# Patient Record
Sex: FEMALE | Race: WHITE
[De-identification: names, ages, dates, MRNs, and addresses within clinical notes are randomized per-mention and may not be internally consistent; named-entity substitution may affect disease eponyms.]

---

## 2018-04-29 ENCOUNTER — HOSPITAL ENCOUNTER (EMERGENCY)
Dept: HOSPITAL 14 - H.ER | Age: 28
Discharge: HOME | End: 2018-04-29
Payer: COMMERCIAL

## 2018-04-29 VITALS — HEART RATE: 81 BPM | TEMPERATURE: 99 F | SYSTOLIC BLOOD PRESSURE: 133 MMHG | DIASTOLIC BLOOD PRESSURE: 90 MMHG

## 2018-04-29 VITALS — RESPIRATION RATE: 18 BRPM

## 2018-04-29 VITALS — OXYGEN SATURATION: 100 %

## 2018-04-29 DIAGNOSIS — O26.891: ICD-10-CM

## 2018-04-29 DIAGNOSIS — O20.9: Primary | ICD-10-CM

## 2018-04-29 DIAGNOSIS — N83.201: ICD-10-CM

## 2018-04-29 DIAGNOSIS — Z3A.01: ICD-10-CM

## 2018-04-29 LAB
ALBUMIN SERPL-MCNC: 3.5 G/DL (ref 3.5–5)
ALBUMIN/GLOB SERPL: 1.1 {RATIO} (ref 1–2.1)
ALT SERPL-CCNC: 17 U/L (ref 9–52)
AST SERPL-CCNC: 52 U/L (ref 14–36)
BACTERIA #/AREA URNS HPF: (no result) /[HPF]
BASOPHILS # BLD AUTO: 0.1 K/UL (ref 0–0.2)
BASOPHILS NFR BLD: 0.7 % (ref 0–2)
BILIRUB UR-MCNC: NEGATIVE MG/DL
BUN SERPL-MCNC: 8 MG/DL (ref 7–17)
CALCIUM SERPL-MCNC: 7.2 MG/DL (ref 8.4–10.2)
COLOR UR: YELLOW
EOSINOPHIL # BLD AUTO: 0.3 K/UL (ref 0–0.7)
EOSINOPHIL NFR BLD: 2.5 % (ref 0–4)
ERYTHROCYTE [DISTWIDTH] IN BLOOD BY AUTOMATED COUNT: 14.3 % (ref 11.5–14.5)
GFR NON-AFRICAN AMERICAN: > 60
GLUCOSE UR STRIP-MCNC: 50 MG/DL
HGB BLD-MCNC: 13.4 G/DL (ref 12–16)
LEUKOCYTE ESTERASE UR-ACNC: (no result) LEU/UL
LYMPHOCYTES # BLD AUTO: 3 K/UL (ref 1–4.3)
LYMPHOCYTES NFR BLD AUTO: 23.7 % (ref 20–40)
MCH RBC QN AUTO: 30 PG (ref 27–31)
MCHC RBC AUTO-ENTMCNC: 33.4 G/DL (ref 33–37)
MCV RBC AUTO: 89.8 FL (ref 81–99)
MONOCYTES # BLD: 0.6 K/UL (ref 0–0.8)
MONOCYTES NFR BLD: 4.7 % (ref 0–10)
NEUTROPHILS # BLD: 8.5 K/UL (ref 1.8–7)
NEUTROPHILS NFR BLD AUTO: 68.4 % (ref 50–75)
NRBC BLD AUTO-RTO: 0.2 % (ref 0–0)
PH UR STRIP: 6 [PH] (ref 5–8)
PLATELET # BLD: 304 K/UL (ref 130–400)
PMV BLD AUTO: 8.7 FL (ref 7.2–11.7)
PROT UR STRIP-MCNC: NEGATIVE MG/DL
RBC # BLD AUTO: 4.46 MIL/UL (ref 3.8–5.2)
RBC # UR STRIP: (no result) /UL
SP GR UR STRIP: 1.02 (ref 1–1.03)
SQUAMOUS EPITHIAL: 12 /HPF (ref 0–5)
URINE CLARITY: (no result)
UROBILINOGEN UR-MCNC: (no result) MG/DL (ref 0.2–1)
WBC # BLD AUTO: 12.5 K/UL (ref 4.8–10.8)

## 2018-04-29 NOTE — ED PDOC
HPI: Female  Pain


Time Seen by Provider: 18 02:03


Chief Complaint (Nursing): Female Genitourinary


Chief Complaint (Provider): Vaginal Bleeding


History Per: Patient


History/Exam Limitations: no limitations


Onset/Duration Of Symptoms: Hrs (x2)


Current Symptoms Are (Timing): Still Present


Additional Complaint(s): 


27 yo female presents to the ED for evaluation of vaginal spotting for the last 

2 hours, which has required the use of one pad. The patient reports visiting a 

clinic 9 days ago for mild lower abdominal cramping, and found out she was ~6 

weeks pregnant. Her abdominal pain has since resolved. This is her second 

pregnancy and currently denies any pain. Of note, her first pregnancy resulted 

in a miscarriage between 4-6 weeks.  She has not had any prenatal care for this 

pregnancy and denies any fever, N/V/D, SOB, urinary symptoms, vaginal discharge

, chest pain, leg swelling, calf tenderness, or shortness of breath. Her last 

menstrual period was on 3/20/18. 


PMD: None


: 2


Para: 0


Miscarriage: 1





Past Medical History


Reviewed: Historical Data, Nursing Documentation, Vital Signs


Vital Signs: 





 Last Vital Signs











Temp  97.9 F   18 01:03


 


Pulse  90   18 01:03


 


Resp  18   18 01:03


 


BP  144/93 H  18 01:03


 


Pulse Ox  100   18 01:03














- Medical History


PMH: No Chronic Diseases





- Surgical History


Surgical History: No Surg Hx





- Family History


Family History: States: Unknown Family Hx





- Social History


Current smoker - smoking cessation education provided: No


Ex-Smoker (has not smoked in the last 12 months): Yes (quit smoking after 

finding out she was pregnant)


Alcohol: Social


Drugs: Denies





- Home Medications


Home Medications: 


 Ambulatory Orders











 Medication  Instructions  Recorded


 


Prenatal 21/Iron Fu/Folic Acid 1 each PO DAILY #30 tablet 18





[Prenatal Complete Caplet]  














- Allergies


Allergies/Adverse Reactions: 


 Allergies











Allergy/AdvReac Type Severity Reaction Status Date / Time


 


No Known Allergies Allergy   Verified 18 01:00














Review of Systems


ROS Statement: Except As Marked, All Systems Reviewed And Found Negative


Constitutional: Negative for: Fever


Cardiovascular: Negative for: Chest Pain


Respiratory: Negative for: Shortness of Breath


Gastrointestinal: Negative for: Abdominal Pain


Genitourinary Female: Positive for: Vaginal Bleeding.  Negative for: Vaginal 

Discharge





Physical Exam





- Reviewed


Nursing Documentation Reviewed: Yes


Vital Signs Reviewed: Yes





- Physical Exam


Appears: Positive for: Well, Non-toxic, No Acute Distress


Head Exam: Positive for: ATRAUMATIC, NORMAL INSPECTION, NORMOCEPHALIC


Skin: Positive for: Normal Color, Warm, Dry


Eye Exam: Positive for: EOMI, PERRL


ENT: Positive for: Pharynx Is (clear, uvula midline), Other (mucus membranes 

moist)


Neck: Positive for: Painless ROM, Supple


Cardiovascular/Chest: Positive for: Regular Rate, Rhythm


Respiratory: Positive for: Normal Breath Sounds.  Negative for: Decreased 

Breath Sounds, Accessory Muscle Use, Respiratory Distress


Gastrointestinal/Abdominal: Positive for: Bowel Sounds (active x4), Soft.  

Negative for: Tenderness, Distended, Guarding


Back: Negative for: L CVA Tenderness, R CVA Tenderness, Vertebral Tenderness


Extremity: Positive for: Normal ROM.  Negative for: Pedal Edema, Calf Tenderness

, Deformity


Neurologic/Psych: Positive for: Alert, Oriented (x3), Gait (steady in ED).  

Negative for: Motor/Sensory Deficits, Aphasia, Facial Droop





- Laboratory Results


Result Diagrams: 


 18 03:16





 18 03:16





- ECG


O2 Sat by Pulse Oximetry: 100 (RA)


Pulse Ox Interpretation: Normal





Medical Decision Making


Medical Decision Making: 


Impression: 


--Vaginal Bleeding in 1st Trimester


Plan:


--Blood Type and Screen


--Beta-HCG, Quantitative


--IV Insertion


--Urinalysis


--CBC


--CMP





Reassess


--0510


Labs reviewed. U/S ordered to r/o confirm IUP.


Patient resting comfortably on re-evaluation.





0710


U/S reviewed, radiology report follows


EXAM:


US Pregnancy, Transvaginal


CLINICAL HISTORY:


28 years old, female; Pain; Pelvic pain; Additional info: (+) preg, bleeding


TECHNIQUE:


Real-time transvaginal obstetrical ultrasound of the maternal pelvis and a 

first trimester pregnancy


with image documentation. Transvaginal imaging was used for better evaluation 

of the fetus and


adnexa.


COMPARISON:


No relevant prior studies available.


FINDINGS:


Gestation: No intrauterine gestational sac.


Uterus/cervix: Endometrium: 2.2 cm in thickness. Closed cervix.


Ovaries: RIGHT ovary: 2.2 x 2.3 x 1.9 cm anechoic lesion. LEFT ovary: Normal. 

No adnexal


masses.


Free fluid: Trace free fluid within pelvis.


IMPRESSION:


1. No intrauterine gestation. DDX: Early IUP, missed , ectopic 

pregnancy.


2. RIGHT ovarian cyst.


Thank you for allowing us to participate in the care of your patient.


Dictated and Authenticated by: Chema Acosta MD


2018 7:03 AM Eastern Time ([place] & [place])





0715


On exam, patient remains AAOx3, in no acute distress. Lungs clear to 

auscultation, cardiac RRR, abdomen soft, non-tender, repeat neuro exam shows no 

focal findings. Patient continues to deny abdominal pain at this time. Also 

denies any increase in vaginal bleeding. Repeat HR: 81. Repeat BP: 133/90.


VSS, patient hemodynamically stable and appropriate for discharge with close 

follow up. 


Lab/Diagnostic results d/w the patient in great detail. Diagnosis of vaginal 

bleeding in first trimester/early pregnancy d/w the patient. 


Based on history, exam and diagnostic results, plan will be for outpatient 

follow up.


Patient instructed to follow-up with pmd / OBGYN / the clinic in 1-2 days 

without fail for repeat U/S and beta quant. Advised to take medication as 

prescribed. Return to the emergency room at any time for any new or worsening 

symptoms. Patient states she fully agrees with and understands discharge 

instructions. States that she agrees with the plan and disposition. Verbalized 

and repeated discharge instructions and plan. I have given the patient 

opportunity to ask any additional questions.





Scribe Attestation:


Documented by Chucky Arellano acting as a scribe for YRN Sheriff. 





Provider Attestation:


All medical record entries made by the Scribe were at my direction and 

personally dictated by me. I have reviewed the chart and agree that the record 

accurately reflects my personal performance of the history, physical exam, 

medical decision making, and the department course for this patient. I have 

also personally directed, reviewed, and agree with the discharge instructions 

and disposition.





Disposition





- Clinical Impression


Clinical Impression: 


 Vaginal bleeding affecting early pregnancy








- Patient ED Disposition


Is Patient to be Admitted: No


Counseled Patient/Family Regarding: Studies Performed, Diagnosis, Need For 

Followup, Rx Given





- Disposition


[facility]errals[facility] 


[facility]


Disposition: Routine/Home


Disposition Time: 07:24


Condition: STABLE


Additional Instructions: 


REPEAT U/S AND BETA QUANT WITHIN 48 HOURS.


RETURN TO ED WITH ANY NEW OR WORSENING SYMPTOMS, INCLUDING ABDOMINAL PAIN OR 

INCREASED BLEEDING.


Prescriptions: 


Prenatal 21/Iron Fu/Folic Acid [Prenatal Complete Caplet] 1 each PO DAILY #30 

tablet


Instructions:  Bleeding With Pregnancy, Pregnancy - The First Month


Forms:  [email or web address] (English)


Print Language: ENGLISH





- POA


Present On Arrival: None





Results





- Lab Results


Lab Results: 

















  18





  04:29 03:16 03:16


 


WBC    12.5 H


 


RBC    4.46


 


Hgb    13.4


 


Hct    40.1


 


MCV    89.8


 


MCH    30.0


 


MCHC    33.4


 


RDW    14.3


 


Plt Count    304


 


MPV    8.7


 


Neut % (Auto)    68.4


 


Lymph % (Auto)    23.7


 


Mono % (Auto)    4.7


 


Eos % (Auto)    2.5


 


Baso % (Auto)    0.7


 


Neut # (Auto)    8.5 H


 


Lymph # (Auto)    3.0


 


Mono # (Auto)    0.6


 


Eos # (Auto)    0.3


 


Baso # (Auto)    0.1


 


Sodium   


 


Potassium   


 


Chloride   


 


Carbon Dioxide   


 


Anion Gap   


 


BUN   


 


Creatinine   


 


Est GFR ( Amer)   


 


Est GFR (Non-Af Amer)   


 


Random Glucose   


 


Calcium   


 


Total Bilirubin   


 


AST   


 


ALT   


 


Alkaline Phosphatase   


 


Total Protein   


 


Albumin   


 


Globulin   


 


Albumin/Globulin Ratio   


 


Beta HCG, Quant   


 


Urine Color   Yellow 


 


Urine Clarity   Cloudy 


 


Urine pH   6.0 


 


Ur Specific Gravity   1.018 


 


Urine Protein   Negative 


 


Urine Glucose (UA)   50 


 


Urine Ketones   Negative 


 


Urine Blood   Large 


 


Urine Nitrate   Negative 


 


Urine Bilirubin   Negative 


 


Urine Urobilinogen   0.2-1.0 


 


Ur Leukocyte Esterase   Trace 


 


Urine RBC (Auto)   4 H 


 


Urine Microscopic WBC   9 H 


 


Ur Squamous Epith Cells   12 H 


 


Urine Bacteria   Rare 


 


Blood Type   


 


Blood Type Confirm  A POSITIVE  


 


Antibody Screen   


 


BBK History Checked   














  18





  03:16 03:07


 


WBC  


 


RBC  


 


Hgb  


 


Hct  


 


MCV  


 


MCH  


 


MCHC  


 


RDW  


 


Plt Count  


 


MPV  


 


Neut % (Auto)  


 


Lymph % (Auto)  


 


Mono % (Auto)  


 


Eos % (Auto)  


 


Baso % (Auto)  


 


Neut # (Auto)  


 


Lymph # (Auto)  


 


Mono # (Auto)  


 


Eos # (Auto)  


 


Baso # (Auto)  


 


Sodium  140 


 


Potassium  5.9 H 


 


Chloride  107 


 


Carbon Dioxide  20 L 


 


Anion Gap  19 


 


BUN  8 


 


Creatinine  0.4 L 


 


Est GFR ( Amer)  > 60 


 


Est GFR (Non-Af Amer)  > 60 


 


Random Glucose  84 


 


Calcium  7.2 L 


 


Total Bilirubin  1.5 H 


 


AST  52 H 


 


ALT  17 


 


Alkaline Phosphatase  41 


 


Total Protein  6.8 


 


Albumin  3.5 


 


Globulin  3.3 


 


Albumin/Globulin Ratio  1.1 


 


Beta HCG, Quant  119.05 


 


Urine Color  


 


Urine Clarity  


 


Urine pH  


 


Ur Specific Gravity  


 


Urine Protein  


 


Urine Glucose (UA)  


 


Urine Ketones  


 


Urine Blood  


 


Urine Nitrate  


 


Urine Bilirubin  


 


Urine Urobilinogen  


 


Ur Leukocyte Esterase  


 


Urine RBC (Auto)  


 


Urine Microscopic WBC  


 


Ur Squamous Epith Cells  


 


Urine Bacteria  


 


Blood Type   A POSITIVE


 


Blood Type Confirm  


 


Antibody Screen   Negative


 


BBK History Checked   No verified bt

## 2018-04-29 NOTE — US
EXAM:

  US Pregnancy, Transvaginal



CLINICAL HISTORY:

  28 years old, female; Pain; Pelvic pain; Additional info: (+) preg, bleeding



TECHNIQUE:

  Real-time transvaginal obstetrical ultrasound of the maternal pelvis and a 

first trimester pregnancy with image documentation.  Transvaginal imaging was 

used for better evaluation of the fetus and adnexa.



COMPARISON:

  No relevant prior studies available.



FINDINGS:

  Gestation:  No intrauterine gestational sac.

  Uterus/cervix:  Endometrium: 2.2 cm in thickness.  Closed cervix.

  Ovaries:  RIGHT ovary: 2.2 x 2.3 x 1.9 cm anechoic lesion.  LEFT ovary: 

Normal.  No adnexal masses.

  Free fluid:  Trace free fluid within pelvis.



IMPRESSION:     

1.  No intrauterine gestation. DDX: Early IUP, missed , ectopic 

pregnancy.

2.  RIGHT ovarian cyst.

## 2018-07-14 ENCOUNTER — HOSPITAL ENCOUNTER (EMERGENCY)
Dept: HOSPITAL 14 - H.ER | Age: 28
Discharge: HOME | End: 2018-07-14
Payer: SELF-PAY

## 2018-07-14 VITALS
HEART RATE: 82 BPM | SYSTOLIC BLOOD PRESSURE: 138 MMHG | TEMPERATURE: 98.4 F | RESPIRATION RATE: 18 BRPM | DIASTOLIC BLOOD PRESSURE: 95 MMHG

## 2018-07-14 VITALS — OXYGEN SATURATION: 100 %

## 2018-07-14 DIAGNOSIS — Z3A.08: ICD-10-CM

## 2018-07-14 DIAGNOSIS — O20.9: Primary | ICD-10-CM

## 2018-07-14 LAB
BACTERIA #/AREA URNS HPF: (no result) /[HPF]
BILIRUB UR-MCNC: NEGATIVE MG/DL
COLOR UR: YELLOW
GLUCOSE UR STRIP-MCNC: (no result) MG/DL
LEUKOCYTE ESTERASE UR-ACNC: (no result) LEU/UL
PH UR STRIP: 6 [PH] (ref 5–8)
PROT UR STRIP-MCNC: 30 MG/DL
RBC # UR STRIP: (no result) /UL
SP GR UR STRIP: 1.02 (ref 1–1.03)
SQUAMOUS EPITHIAL: 2 /HPF (ref 0–5)
URINE CLARITY: (no result)
UROBILINOGEN UR-MCNC: (no result) MG/DL (ref 0.2–1)

## 2018-07-14 NOTE — ED PDOC
- ECG


O2 Sat by Pulse Oximetry: 100





- Progress


ED Course And Treament: 





TIME 19:20 :Patient signed out to the writer by Dr. Slaughter pending labs and 

imaging. 





Pending: 


--Beta Quant


--Urine Dip


--Transvaginal U/S.





TIME: 2035





Beta Quant: [id number]


Udip: +Blood





Pending TV ultrasound. 


Lying comfortably in bed. 





TIME 22:00 Patient in u/s





Disposition





- Clinical Impression


Clinical Impression: 


 Vaginal bleeding








- Disposition


Condition: STABLE


Forms:  [employer] (English)

## 2018-07-14 NOTE — ED PDOC
HPI: Female  Pain


History/Exam Limitations: no limitations


Onset/Duration Of Symptoms: Days


Severity: Mild


Quality Of Discomfort: Cramping


Associated Symptoms: denies: Fever, Chills, Nausea, Vomiting, Diarrhea


Additional Complaint(s): 





CC: vaginal bleeding


HPI: 29 YO female  @ approx 8 wks IUP presents to [facility] ED for vaginal 

bleeding. Pt recently found out she was pregnant 1 month ago, not sure about 

LMP because she recently had a miscarriage in 18 and never had her period 

after SAB. Additionally, pt was seen by MD for vaginal bleeding in 7/3/18. 

Ultrasound () was sig for single intrauterine pregnancy with no heart rate 

detected (likely fetal demise[place] [facility] [place] Pt states that she had 

some vaginal bleeding yesterday, small amount, and passed dark clots today. 

Endorsing mild suprapubic cramping pain. Denies chest pain, dyspnea, n/v/d/c, 

dysuria, hemturia, fever, chills





MD: Dr. Perez


PMHx: denies


SurgHx: denies


SH: smoking 12+yrs quit 2 months ago, social ETOH and denies illicit drug use


FH: denies, no blood disorders


Allergies: NKDA


Meds: PNV


    





<Nelda Slaughter - Last Filed: 18 19:44>


History Per: Patient





<JasonEyal - Last Filed: 18 20:01>


Time Seen by Provider: 18 18:37


Chief Complaint (Nursing): Female Genitourinary





Past Medical History


Vital Signs: 





 Last Vital Signs











Temp  98.8 F   18 18:18


 


Pulse  78   18 18:18


 


Resp  16   18 18:18


 


BP  129/94 H  18 18:18


 


Pulse Ox  100   18 18:18














- Medical History


PMH: No Chronic Diseases





- Surgical History


Surgical History: No Surg Hx





- Family History


Family History: States: No Known Family Hx





- Living Arrangements


Living Arrangements: With Family





- Social History


Current smoker - smoking cessation education provided: No


Ex-Smoker (has not smoked in the last 12 months): Yes (12+yrs)


Alcohol: Social


Drugs: Denies





<Nelda Slaughter - Last Filed: 18 19:44>


Reviewed: Historical Data, Nursing Documentation, Vital Signs


Vital Signs: 





 Last Vital Signs











Temp  98.8 F   18 18:18


 


Pulse  78   18 18:18


 


Resp  16   18 18:18


 


BP  129/94 H  18 18:18


 


Pulse Ox  100   18 19:49














<JasonEyal - Last Filed: 18 20:01>





- Home Medications


Home Medications: 


 Ambulatory Orders











 Medication  Instructions  Recorded


 


Prenatal 21/Iron Fu/Folic Acid 1 each PO DAILY #30 tablet 18





[Prenatal Complete Caplet]  














- Allergies


Allergies/Adverse Reactions: 


 Allergies











Allergy/AdvReac Type Severity Reaction Status Date / Time


 


No Known Allergies Allergy   Verified 18 01:00














Review of Systems


Constitutional: Negative for: Fever, Chills


Cardiovascular: Negative for: Chest Pain, Palpitations


Respiratory: Negative for: Cough, Shortness of Breath


Gastrointestinal: Positive for: Abdominal Pain.  Negative for: Nausea, Vomiting

, Diarrhea, Constipation


Genitourinary Female: Positive for: Vaginal Bleeding.  Negative for: Dysuria, 

Frequency


Neurological: Negative for: Weakness, Numbness





<Nelda Slaughter - Last Filed: 18 19:44>


ROS Statement: Except As Marked, All Systems Reviewed And Found Negative





<Eyal Gomez - Last Filed: 18 20:01>





Physical Exam





- Physical Exam


Appears: Positive for: No Acute Distress


Head Exam: Positive for: ATRAUMATIC


Skin: Positive for: Normal Color.  Negative for: Pallor, Mottled


Eye Exam: Positive for: Normal appearance, EOMI


Cardiovascular/Chest: Positive for: Regular Rate, Rhythm.  Negative for: Murmur


Respiratory: Positive for: Normal Breath Sounds.  Negative for: Wheezing


Gastrointestinal/Abdominal: Positive for: Normal Exam, Bowel Sounds, Soft.  

Negative for: Tenderness


Pelvic Exam: Positive for: Other (cervix finger tip, ripening of the cervix, 

non tender).  Negative for: No Masses, Active Bleeding


Back: Positive for: Normal Inspection.  Negative for: L CVA Tenderness, R CVA 

Tenderness


Extremity: Positive for: Normal ROM.  Negative for: Tenderness, Pedal Edema


Neurologic/Psych: Positive for: Alert





<Nelda Slaughter - Last Filed: 18 19:44>





- Reviewed


Vital Signs Reviewed: Yes (elevated DBP)





<Eyal Gomez - Last Filed: 18 20:01>





- ECG


O2 Sat by Pulse Oximetry: 100





- Progress


ED Course And Treament: 





37 YO  @ approx 8wks IUP with vaginal bleeding. VS stable, NAD





-transvaginal u/s


-udip, UA


-BHCG quant





Pt endorsed to Dr. Haddad  and Dr. Schmidt. 





<SukhjinderNelda - Last Filed: 18 19:44>





- ECG


Pulse Ox Interpretation: Normal





- Progress


ED Course And Treament: 








I performed the hx and physical exam of the patient and discussed their mgt 

with the RESIDENT. I reviewed the RESIDENT's NOTE and agree with the assessment 

and plan of care.





pt appearing comfortable, resting in bed


pt is awaiting Pelvic sono and lab results





7:10pm - pt is endorsed to overnight ED attending, Dr Schmidt, awaiting U/S and 

lab/ua results, pt can be dispositioned accordingly


Re-evaluation Time: 19:30


Condition: Re-examined, Unchanged





<Eyal Gomez - Last Filed: 18 20:01>





Medical Decision Making


Medical Decision Making: 





Impression: vag bleeding/pregnancy


i have consider all the differential diagnosis regarding pt's chief medical 

complaints/clinical findings, including but are not limited to: threaten ab?





A/P: vag bleeding


- us


- ua


- bhcg


- supportive care


- observe/reevaluation





<JasonEyal - Last Filed: 18 20:01>





Disposition





- Patient ED Disposition


Is Patient to be Admitted: Transfer of Care





- Disposition


Disposition Time: 19:38





<Nelda Slaughter - Last Filed: 18 19:44>


Discussed With DrUrmila: Tera Schmidt (pt is awaiting U/S, UA, B-HCG level, 

pt can be dispositioned accordingly)


Counseled Patient/Family Regarding: Diagnosis, Need For Followup, Rx Given





<Eyal Gomez - Last Filed: 18 20:01>





- Clinical Impression


Clinical Impression: 


 Vaginal bleeding








- Disposition


Condition: STABLE


Forms:  [employer] (English)

## 2018-07-15 NOTE — US
Date of service: 



07/14/2018



HISTORY:

vaginal bleeding



COMPARISON:

Comparison made with pelvic ultrasound 07/11/2018



TECHNIQUE:

Transvaginal sonographic evaluation of the pelvis performed.



FINDINGS:



UTERUS:

Measures 3.1 x 6.0 x 5.0 cm. Normal in size and appearance. 

Anteverted No fibroid or other mass lesion seen.



ENDOMETRIUM:

Gestational sac: MSD = 2.1 = 6 weeks 4 days 



Yolk sac: 0.45 cm



Fetal pole: 0.34 cm = 6 weeks 0 days 



Heart motion: No heart rate detected 



Average ultrasound age: 6 weeks 2 days 0 weeks 3 days 



PRITESH based on average ultrasound age: 03/07/2019 



There is a small subchorionic hemorrhage measuring 1.0 x 1.8 x 0.8 

cm. 



CERVIX:

Cervix closed



RIGHT OVARY:

Measures 3.4 x 2.4 x 2.4 cm. No solid mass. Normal flow. .  There is 

a small corpus luteum cyst



LEFT OVARY:

Measures 3.4 x 2.3 x 2.6 cm. No solid mass. Normal flow. 



FREE FLUID:

No significant free fluid noted.



OTHER FINDINGS:

None. 



IMPRESSION:

Gestational sac and fetal pole however no heart rate detected.  

Findings likely represent fetal demise however continued followup at 

interval recommended to assess for possible development of viable 

intrauterine gestation. Follow-up serial serum beta HCG and serial 

pelvic ultrasounds recommended 



Small subchorionic hemorrhage measuring 1.0 x 1.8 x 0.8 cm

## 2019-03-21 ENCOUNTER — HOSPITAL ENCOUNTER (EMERGENCY)
Dept: HOSPITAL 14 - H.ER | Age: 29
LOS: 1 days | Discharge: HOME | End: 2019-03-22
Payer: SELF-PAY

## 2019-03-21 VITALS — OXYGEN SATURATION: 99 %

## 2019-03-21 DIAGNOSIS — G43.909: Primary | ICD-10-CM

## 2019-03-21 DIAGNOSIS — J01.90: ICD-10-CM

## 2019-03-21 LAB
ALBUMIN SERPL-MCNC: 4.5 G/DL (ref 3.5–5)
ALBUMIN/GLOB SERPL: 1.3 {RATIO} (ref 1–2.1)
ALT SERPL-CCNC: 27 U/L (ref 9–52)
AST SERPL-CCNC: 21 U/L (ref 14–36)
BASOPHILS # BLD AUTO: 0 K/UL (ref 0–0.2)
BASOPHILS NFR BLD: 0.5 % (ref 0–2)
BUN SERPL-MCNC: 8 MG/DL (ref 7–17)
CALCIUM SERPL-MCNC: 9.8 MG/DL (ref 8.4–10.2)
EOSINOPHIL # BLD AUTO: 0.3 K/UL (ref 0–0.7)
EOSINOPHIL NFR BLD: 2.8 % (ref 0–4)
ERYTHROCYTE [DISTWIDTH] IN BLOOD BY AUTOMATED COUNT: 12.5 % (ref 11.5–14.5)
GFR NON-AFRICAN AMERICAN: > 60
HGB BLD-MCNC: 13.7 G/DL (ref 12–16)
LYMPHOCYTES # BLD AUTO: 2.7 K/UL (ref 1–4.3)
LYMPHOCYTES NFR BLD AUTO: 27.2 % (ref 20–40)
MCH RBC QN AUTO: 29.9 PG (ref 27–31)
MCHC RBC AUTO-ENTMCNC: 33.1 G/DL (ref 33–37)
MCV RBC AUTO: 90.3 FL (ref 81–99)
MONOCYTES # BLD: 0.5 K/UL (ref 0–0.8)
MONOCYTES NFR BLD: 5.5 % (ref 0–10)
NEUTROPHILS # BLD: 6.2 K/UL (ref 1.8–7)
NEUTROPHILS NFR BLD AUTO: 64 % (ref 50–75)
NRBC BLD AUTO-RTO: 0 % (ref 0–0)
PLATELET # BLD: 267 K/UL (ref 130–400)
PMV BLD AUTO: 7.8 FL (ref 7.2–11.7)
RBC # BLD AUTO: 4.58 MIL/UL (ref 3.8–5.2)
WBC # BLD AUTO: 9.8 K/UL (ref 4.8–10.8)

## 2019-03-21 PROCEDURE — 80053 COMPREHEN METABOLIC PANEL: CPT

## 2019-03-21 PROCEDURE — 96374 THER/PROPH/DIAG INJ IV PUSH: CPT

## 2019-03-21 PROCEDURE — 87070 CULTURE OTHR SPECIMN AEROBIC: CPT

## 2019-03-21 PROCEDURE — 81025 URINE PREGNANCY TEST: CPT

## 2019-03-21 PROCEDURE — 99285 EMERGENCY DEPT VISIT HI MDM: CPT

## 2019-03-21 PROCEDURE — 96375 TX/PRO/DX INJ NEW DRUG ADDON: CPT

## 2019-03-21 PROCEDURE — 87804 INFLUENZA ASSAY W/OPTIC: CPT

## 2019-03-21 PROCEDURE — 81003 URINALYSIS AUTO W/O SCOPE: CPT

## 2019-03-21 PROCEDURE — 85025 COMPLETE CBC W/AUTO DIFF WBC: CPT

## 2019-03-21 PROCEDURE — 87430 STREP A AG IA: CPT

## 2019-03-21 NOTE — ED PDOC
HPI:  Headache


Time Seen by Provider: 03/21/19 21:26


Chief Complaint (Nursing): Headache


Chief Complaint (Provider): Headache/Migrane


History Per: Patient


History/Exam Limitations: no limitations


Onset/Duration Of Symptoms: Days (one)


Current Symptoms Are (Timing): Still Present


Severity: Mild


Quality: Squeezing, "Pain"


Preceeding Symptoms: None


Associated Symptoms: Nausea, Vomiting


Additional Complaint(s): 





Pt presents to the ED as a clinic patient with a history of migraine headaches. 

Pt indicates that this headache is mildly different in that it effects her fron

rick sinus area. Pt also complains of nausea and vomiting with this headache. Pt 

denies pre symptoms such as an aura; Pt denies other illnesses or complaints





Past Medical History


Reviewed: Historical Data, Nursing Documentation, Vital Signs


Vital Signs: 





                                Last Vital Signs











Temp  98.4 F   03/21/19 20:24


 


Pulse  81   03/21/19 20:24


 


Resp  16   03/21/19 20:24


 


BP  156/94 H  03/21/19 20:24


 


Pulse Ox  99   03/21/19 20:24














- Family History


Family History: States: Unknown Family Hx





- Home Medications


Home Medications: 


                                Ambulatory Orders











 Medication  Instructions  Recorded


 


Prenatal 21/Iron Fu/Folic Acid 1 each PO DAILY #30 tablet 04/29/18





[Prenatal Complete Caplet]  


 


Amoxicillin/Clavulanate [Augmentin 1 tab PO BID #20 tab 03/22/19





875 MG-125 MG]  














- Allergies


Allergies/Adverse Reactions: 


                                    Allergies











Allergy/AdvReac Type Severity Reaction Status Date / Time


 


No Known Allergies Allergy   Verified 03/21/19 20:22














Review of Systems


ROS Statement: Except As Marked, All Systems Reviewed And Found Negative


Gastrointestinal: Positive for: Nausea, Vomiting


Neurological: Positive for: Headache





Physical Exam





- Reviewed


Nursing Documentation Reviewed: Yes


Vital Signs Reviewed: Yes





- Physical Exam


Appears: Positive for: Well, Non-toxic, No Acute Distress.  Negative for: 

Uncomfortable


Head Exam: Positive for: ATRAUMATIC, NORMAL INSPECTION


Skin: Positive for: Normal Color, Warm, Dry.  Negative for: Diaphoresis, Pallor,

Rash


Eye Exam: Positive for: Normal appearance, EOMI, PERRL.  Negative for: 

Nystagmus, Periorbital swelling, Periorbital tenderness


ENT: Positive for: Pharynx Is (mildly erythematous with no tonsilar or 

pharangeal exudate), Sinus Pain/Drainage (there is frontal sinus pain 

bilaterally to palpation), Pharyngeal Erythema.  Negative for: Nasal Congestion,

Tonsillar Exudate, Tonsillar Swelling


Neck: Positive for: Normal, Painless ROM, Supple.  Negative for: Decreased ROM


Cardiovascular/Chest: Positive for: Regular Rate, Rhythm, Chest Non Tender.  

Negative for: Bradycardia, Tachycardia


Respiratory: Positive for: Normal Breath Sounds.  Negative for: Crackles, Rales,

Rhonchi, Stridor, Wheezing, Respiratory Distress


Pulses-Carotid (L): 2+


Pulses-Carotid (R): 2+


Pulses-Radial (L): 2+


Pulses-Radial (R): 2+





- Laboratory Results


Result Diagrams: 


                                 03/21/19 21:57





                                 03/21/19 21:57





- ECG


O2 Sat by Pulse Oximetry: 99





Medical Decision Making


Medical Decision Making: 





I: Migraine HA with nausea


P: CBC CMP UA UPreg


    Fluids APAP Toradol Benadryl and zofran


    Flu and STrep swab





Disposition





- Clinical Impression


Clinical Impression: 


 Acute sinusitis








- Patient ED Disposition


Is Patient to be Admitted: No





- Disposition


Disposition Time: 00:53


Condition: IMPROVED


Prescriptions: 


Amoxicillin/Clavulanate [Augmentin 875 MG-125 MG] 1 tab PO BID #20 tab


Instructions:  Sinusitis in Adults, Sinusitis, Adult (DC)


Forms:  [employer] (English)

## 2019-03-22 VITALS
SYSTOLIC BLOOD PRESSURE: 137 MMHG | DIASTOLIC BLOOD PRESSURE: 73 MMHG | HEART RATE: 74 BPM | RESPIRATION RATE: 17 BRPM | TEMPERATURE: 98.2 F

## 2019-03-22 LAB
BILIRUB UR-MCNC: NEGATIVE MG/DL
COLOR UR: (no result)
GLUCOSE UR STRIP-MCNC: (no result) MG/DL
LEUKOCYTE ESTERASE UR-ACNC: (no result) LEU/UL
PH UR STRIP: 6 [PH] (ref 5–8)
PROT UR STRIP-MCNC: NEGATIVE MG/DL
RBC # UR STRIP: NEGATIVE /UL
SP GR UR STRIP: 1.01 (ref 1–1.03)
SQUAMOUS EPITHIAL: 2 /HPF (ref 0–5)
URINE CLARITY: (no result)
UROBILINOGEN UR-MCNC: (no result) MG/DL (ref 0.2–1)